# Patient Record
Sex: FEMALE | Race: WHITE | Employment: STUDENT | URBAN - METROPOLITAN AREA
[De-identification: names, ages, dates, MRNs, and addresses within clinical notes are randomized per-mention and may not be internally consistent; named-entity substitution may affect disease eponyms.]

---
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
## 2019-08-13 ENCOUNTER — ED HISTORICAL/CONVERTED ENCOUNTER (OUTPATIENT)
Dept: OTHER | Age: 19
End: 2019-08-13

## 2019-11-21 ENCOUNTER — OFFICE VISIT (OUTPATIENT)
Dept: FAMILY MEDICINE CLINIC | Age: 19
End: 2019-11-21

## 2019-11-21 VITALS
HEART RATE: 68 BPM | RESPIRATION RATE: 16 BRPM | TEMPERATURE: 98 F | DIASTOLIC BLOOD PRESSURE: 69 MMHG | WEIGHT: 107 LBS | BODY MASS INDEX: 19.69 KG/M2 | OXYGEN SATURATION: 99 % | HEIGHT: 62 IN | SYSTOLIC BLOOD PRESSURE: 103 MMHG

## 2019-11-21 DIAGNOSIS — R41.840 INATTENTION: Primary | ICD-10-CM

## 2019-11-21 NOTE — PROGRESS NOTES
Chief Complaint   Patient presents with    Medication Evaluation     Patient presents in office today to discuss going back on Adderall. She states that she was on it as a child. She has to be on the brand name. Generic caused palpitations. No concerns. 1. Have you been to the ER, urgent care clinic since your last visit? Hospitalized since your last visit? No    2. Have you seen or consulted any other health care providers outside of the [address] [place] [address] since your last visit? Include any pap smears or colon screening.  No    Learning Assessment 3/8/2016   PRIMARY LEARNER Patient   HIGHEST LEVEL OF EDUCATION - PRIMARY LEARNER  DID NOT GRADUATE HIGH SCHOOL   BARRIERS PRIMARY LEARNER NONE   CO-LEARNER CAREGIVER No   PRIMARY LANGUAGE ENGLISH   LEARNER PREFERENCE PRIMARY DEMONSTRATION   ANSWERED BY pt   RELATIONSHIP SELF

## 2019-11-21 NOTE — PROGRESS NOTES
Chief Complaint   Patient presents with    Medication Evaluation     Mickey Armando is a 20 y.o. female who presents for evaluation. Was diagnosed with ADHD as a child and would like to get back on medication. States she has not been on medication for years because her father got custody of her and did not agree with medication. Prior to living with her father she was started on Adderall by pediatrician. States that she had palpations with generic so she can only be on brand name. Does not have proof of neuro psych testing today. No discussion of ADHD in previous notes from this office. Focus and sitting still is most problematic. Now in college and having trouble with completing assignments. Reviewed PmHx, RxHx, FmHx, SocHx, AllgHx and updated and dated in the chart. Patient Active Problem List    Diagnosis    Acute stress disorder    Agoraphobia with panic disorder       Review of Systems - negative except as listed above in the HPI    Objective:     Vitals:    11/21/19 0928   BP: 103/69   Pulse: 68   Resp: 16   Temp: 98 °F (36.7 °C)   TempSrc: Oral   SpO2: 99%   Weight: 107 lb (48.5 kg)   Height: 5' 2\" (1.575 m)     Physical Examination: General appearance - alert, well appearing, and in no distress  Mental status - alert, oriented to person, place, and time  Chest - clear to auscultation, no wheezes, rales or rhonchi, symmetric air entry  Heart - normal rate, regular rhythm, normal S1, S2, no murmurs, rubs, clicks or gallops    Assessment/ Plan:   Diagnoses and all orders for this visit:    1. Inattention  -     REFERRAL TO NEUROPSYCHOLOGY  - Advised that in order to get medication covered she needs proof of neuropsych testing, can either try and get documentation from mother or make appt with [facility] for testing.  - Pt to return to office once has neuropsych testing to confirm diagosis. Advised will need UDS on next visit in order to start medication.  Pt verbalizes understanding of plan. Follow-up and Dispositions    · Return if symptoms worsen or fail to improve. I have discussed the diagnosis with the patient and the intended plan as seen in the above orders. The patient understands and agrees with the plan. The patient has received an after-visit summary and questions were answered concerning future plans. Medication Side Effects and Warnings were discussed with patient  Patient Labs were reviewed and or requested:  Patient Past Records were reviewed and or requested    Zaid Erickson, LESLEY  [address] [facility]    There are no Patient Instructions on file for this visit.

## 2020-01-30 ENCOUNTER — DOCUMENTATION ONLY (OUTPATIENT)
Dept: FAMILY MEDICINE CLINIC | Age: 20
End: 2020-01-30

## 2020-02-06 ENCOUNTER — DOCUMENTATION ONLY (OUTPATIENT)
Dept: FAMILY MEDICINE CLINIC | Age: 20
End: 2020-02-06

## 2020-02-06 ENCOUNTER — OFFICE VISIT (OUTPATIENT)
Dept: FAMILY MEDICINE CLINIC | Age: 20
End: 2020-02-06

## 2020-02-06 VITALS
HEIGHT: 62 IN | BODY MASS INDEX: 20.06 KG/M2 | OXYGEN SATURATION: 99 % | TEMPERATURE: 98.1 F | SYSTOLIC BLOOD PRESSURE: 103 MMHG | RESPIRATION RATE: 16 BRPM | DIASTOLIC BLOOD PRESSURE: 67 MMHG | WEIGHT: 109 LBS | HEART RATE: 80 BPM

## 2020-02-06 DIAGNOSIS — F98.8 ATTENTION DEFICIT DISORDER (ADD) IN ADULT: Primary | ICD-10-CM

## 2020-02-06 DIAGNOSIS — Z51.81 MEDICATION MONITORING ENCOUNTER: ICD-10-CM

## 2020-02-06 LAB
BARBITURATES UR POC: NEGATIVE
BENZODIAZEPINES UR POC: NEGATIVE
COCAINE QL URINE POC: NEGATIVE
LOT EXP DATE POC: NORMAL
LOT NUMBER POC: NORMAL
MARIJUANA (THC) QL URINE POC: NEGATIVE
MDMA/ECSTASY UR POC: NEGATIVE
METHADONE QL URINE POC: NEGATIVE
METHAMPHETAMINE QL URINE POC: NEGATIVE
NTI OTHER MICRO TRANSPORT 977598: NEGATIVE
OPIATES QL URINE POC: NEGATIVE
OXYCODONE UR POC: NEGATIVE
VALID INTERNAL CONTROL?: YES

## 2020-02-06 RX ORDER — CETIRIZINE HCL 10 MG
TABLET ORAL
COMMUNITY

## 2020-02-06 RX ORDER — BISMUTH SUBSALICYLATE 262 MG
1 TABLET,CHEWABLE ORAL DAILY
COMMUNITY

## 2020-02-06 NOTE — PATIENT INSTRUCTIONS
A Healthy Lifestyle: Care Instructions  Your Care Instructions    A healthy lifestyle can help you feel good, stay at a healthy weight, and have plenty of energy for both work and play. A healthy lifestyle is something you can share with your whole family. A healthy lifestyle also can lower your risk for serious health problems, such as high blood pressure, heart disease, and diabetes. You can follow a few steps listed below to improve your health and the health of your family. Follow-up care is a key part of your treatment and safety. Be sure to make and go to all appointments, and call your doctor if you are having problems. It's also a good idea to know your test results and keep a list of the medicines you take. How can you care for yourself at home? · Do not eat too much sugar, fat, or fast foods. You can still have dessert and treats now and then. The goal is moderation. · Start small to improve your eating habits. Pay attention to portion sizes, drink less juice and soda pop, and eat more fruits and vegetables. ? Eat a healthy amount of food. A 3-ounce serving of meat, for example, is about the size of a deck of cards. Fill the rest of your plate with vegetables and whole grains. ? Limit the amount of soda and sports drinks you have every day. Drink more water when you are thirsty. ? Eat at least 5 servings of fruits and vegetables every day. It may seem like a lot, but it is not hard to reach this goal. A serving or helping is 1 piece of fruit, 1 cup of vegetables, or 2 cups of leafy, raw vegetables. Have an apple or some carrot sticks as an afternoon snack instead of a candy bar. Try to have fruits and/or vegetables at every meal.  · Make exercise part of your daily routine. You may want to start with simple activities, such as walking, bicycling, or slow swimming. Try to be active 30 to 60 minutes every day. You do not need to do all 30 to 60 minutes all at once.  For example, you can exercise 3 times a day for 10 or 20 minutes. Moderate exercise is safe for most people, but it is always a good idea to talk to your doctor before starting an exercise program.  · Keep moving. [place] the lawn, work in the garden, or tuta.co. Take the stairs instead of the elevator at work. · If you smoke, quit. People who smoke have an increased risk for heart attack, stroke, cancer, and other lung illnesses. Quitting is hard, but there are ways to boost your chance of quitting tobacco for good. ? Use nicotine gum, patches, or lozenges. ? Ask your doctor about stop-smoking programs and medicines. ? Keep trying. In addition to reducing your risk of diseases in the future, you will notice some benefits soon after you stop using tobacco. If you have shortness of breath or asthma symptoms, they will likely get better within a few weeks after you quit. · Limit how much alcohol you drink. Moderate amounts of alcohol (up to 2 drinks a day for men, 1 drink a day for women) are okay. But drinking too much can lead to liver problems, high blood pressure, and other health problems. Family health  If you have a family, there are many things you can do together to improve your health. · Eat meals together as a family as often as possible. · Eat healthy foods. This includes fruits, vegetables, lean meats and dairy, and whole grains. · Include your family in your fitness plan. Most people think of activities such as jogging or tennis as the way to fitness, but there are many ways you and your family can be more active. Anything that makes you breathe hard and gets your heart pumping is exercise. Here are some tips:  ? Walk to do errands or to take your child to school or the bus.  ? Go for a family bike ride after dinner instead of watching TV. Where can you learn more? Go to http://lucila-petar.info/. Enter C888 in the search box to learn more about \"A Healthy Lifestyle: Care Instructions. \"  Current as of: May 28, 2019  Content Version: 12.2  © 5576-0884 [employer]. Care instructions adapted under license by [employer] (which disclaims liability or warranty for this information). If you have questions about a medical condition or this instruction, always ask your healthcare professional. [address] any warranty or liability for your use of this information.

## 2020-02-06 NOTE — PROGRESS NOTES
Chief Complaint   Patient presents with    Medication Evaluation     Patient presents in office today to discuss going on ADHD meds. Was [facility] and had a neuropsych eval done. No concerns. 1. Have you been to the ER, urgent care clinic since your last visit? Hospitalized since your last visit? No    2. Have you seen or consulted any other health care providers outside of the [address] [place] [address] since your last visit? Include any pap smears or colon screening.  Yes When: 1/30/20 Where: [facility] Reason for visit: neuropsych eval    Learning Assessment 3/8/2016   PRIMARY LEARNER Patient   HIGHEST LEVEL OF EDUCATION - PRIMARY LEARNER  DID NOT GRADUATE HIGH SCHOOL   BARRIERS PRIMARY LEARNER NONE   CO-LEARNER CAREGIVER No   PRIMARY LANGUAGE ENGLISH   LEARNER PREFERENCE PRIMARY DEMONSTRATION   ANSWERED BY pt   RELATIONSHIP SELF

## 2020-02-06 NOTE — PROGRESS NOTES
Jolene Brown is a 20 y.o. female   Chief Complaint   Patient presents with    Medication Evaluation    pt here to discuss her neuropsych eval and was seen at [facility] and Dx with ADD. Pt was also dx as a child and was on medication which had worked well for her. No hx of drug abuse or misuse. she is a 20y.o. year old female who presents for evalution. Reviewed PmHx, RxHx, FmHx, SocHx, AllgHx and updated and dated in the chart. Review of Systems - negative except as listed above in the HPI    Objective:     Vitals:    02/06/20 0908   BP: 103/67   Pulse: 80   Resp: 16   Temp: 98.1 °F (36.7 °C)   TempSrc: Oral   SpO2: 99%   Weight: 109 lb (49.4 kg)   Height: 5' 2\" (1.575 m)       Current Outpatient Medications   Medication Sig    cetirizine (ZYRTEC) 10 mg tablet Take  by mouth.  multivitamin (ONE A DAY) tablet Take 1 Tab by mouth daily.  lisdexamfetamine (VYVANSE) 30 mg capsule Take 1 Cap by mouth every morning. Max Daily Amount: 30 mg.    ondansetron (ZOFRAN ODT) 4 mg disintegrating tablet Take 1 Tab by mouth every eight (8) hours as needed for Nausea.  guaiFENesin-codeine (CHERATUSSIN AC) 100-10 mg/5 mL solution Take 5 mL by mouth three (3) times daily as needed for Cough. Max Daily Amount: 15 mL.  hydrOXYzine (VISTARIL) 50 mg capsule Take 1 Cap by mouth three (3) times daily as needed for Anxiety.  citalopram (CELEXA) 20 mg tablet Take 1 Tab by mouth daily.  sertraline (ZOLOFT) 50 mg tablet Take 1 Tab by mouth daily. No current facility-administered medications for this visit. Physical Examination: General appearance - alert, well appearing, and in no distress  Mental status - alert, oriented to person, place, and time  Chest - clear to auscultation, no wheezes, rales or rhonchi, symmetric air entry  Heart - normal rate, regular rhythm, normal S1, S2, no murmurs, rubs, clicks or gallops      Assessment/ Plan:   Diagnoses and all orders for this visit:    1. Attention deficit disorder (ADD) in adult  -     lisdexamfetamine (VYVANSE) 30 mg capsule; Take 1 Cap by mouth every morning. Max Daily Amount: 30 mg.    2. Medication monitoring encounter  -     AMB POC ALERE ICUP DX DRUG SCREEN 10     neuropsych eval sent for scanning. Utox and  both appropriate  Follow-up and Dispositions    · Return in about 4 weeks (around 3/5/2020), or if symptoms worsen or fail to improve. I have discussed the diagnosis with the patient and the intended plan as seen in the above orders. The patient has received an after-visit summary and questions were answered concerning future plans. Pt conveyed understanding of plan.     Medication Side Effects and Warnings were discussed with patient      Se Serna, DO

## 2020-02-21 NOTE — PROGRESS NOTES
Per [employer] the PA for Vyvanse has been approved until 2/5/2021. Ref#: [id number]    Pharm notified via fax.

## 2020-02-25 ENCOUNTER — TELEPHONE (OUTPATIENT)
Dept: FAMILY MEDICINE CLINIC | Age: 20
End: 2020-02-25

## 2020-02-25 NOTE — TELEPHONE ENCOUNTER
Pt would like a call back regarding her Vyvanse prescription. Pt stated that she has a few questions.   Phone [phone]

## 2020-02-25 NOTE — TELEPHONE ENCOUNTER
Called and spoke with patient. She wanted to know when she was to f/u with Dr. Stevan Castellon. I advised that after her last visit he wanted her to f/u in 4 weeks.  F/u apt scheduled for 3/4/20

## 2020-03-04 ENCOUNTER — OFFICE VISIT (OUTPATIENT)
Dept: FAMILY MEDICINE CLINIC | Age: 20
End: 2020-03-04

## 2020-03-04 VITALS
RESPIRATION RATE: 16 BRPM | HEART RATE: 82 BPM | BODY MASS INDEX: 19.51 KG/M2 | HEIGHT: 62 IN | OXYGEN SATURATION: 100 % | TEMPERATURE: 97.6 F | SYSTOLIC BLOOD PRESSURE: 98 MMHG | DIASTOLIC BLOOD PRESSURE: 65 MMHG | WEIGHT: 106 LBS

## 2020-03-04 DIAGNOSIS — F98.8 ATTENTION DEFICIT DISORDER (ADD) IN ADULT: ICD-10-CM

## 2020-03-04 NOTE — PROGRESS NOTES
Rama Phoenix is a 20 y.o. female   Chief Complaint   Patient presents with    Medication Refill    pt states the vyvanse is helping with her focus and is less forgetful. She is tolerating. Was a little jittery when she first started but this has resolved. No issues with insomnia. Appetite is fine. Pt works swing shift so takes around 1030 to 11 am has not taken today. Gets off of work weekdays 1am weekends 2am.      she is a 20y.o. year old female who presents for evalution. Reviewed PmHx, RxHx, FmHx, SocHx, AllgHx and updated and dated in the chart. Review of Systems - negative except as listed above in the HPI    Objective:     Vitals:    03/04/20 0953 03/04/20 1017   BP: 98/65    Pulse: (!) 107 82   Resp: 16    Temp: 97.6 °F (36.4 °C)    TempSrc: Oral    SpO2: 100%    Weight: 106 lb (48.1 kg)    Height: 5' 2\" (1.575 m)        Current Outpatient Medications   Medication Sig    Lisdexamfetamine (VYVANSE) 40 mg capsule Take 1 Cap by mouth every morning. Max Daily Amount: 40 mg.    cetirizine (ZYRTEC) 10 mg tablet Take  by mouth.  multivitamin (ONE A DAY) tablet Take 1 Tab by mouth daily.  ondansetron (ZOFRAN ODT) 4 mg disintegrating tablet Take 1 Tab by mouth every eight (8) hours as needed for Nausea.  guaiFENesin-codeine (CHERATUSSIN AC) 100-10 mg/5 mL solution Take 5 mL by mouth three (3) times daily as needed for Cough. Max Daily Amount: 15 mL.  hydrOXYzine (VISTARIL) 50 mg capsule Take 1 Cap by mouth three (3) times daily as needed for Anxiety.  citalopram (CELEXA) 20 mg tablet Take 1 Tab by mouth daily.  sertraline (ZOLOFT) 50 mg tablet Take 1 Tab by mouth daily. No current facility-administered medications for this visit.         Physical Examination: General appearance - alert, well appearing, and in no distress  Chest - clear to auscultation, no wheezes, rales or rhonchi, symmetric air entry  Heart - normal rate, regular rhythm, normal S1, S2, no murmurs, rubs, clicks or gallops      Assessment/ Plan:   Diagnoses and all orders for this visit:    1. Attention deficit disorder (ADD) in adult  -     Lisdexamfetamine (VYVANSE) 40 mg capsule; Take 1 Cap by mouth every morning. Max Daily Amount: 40 mg. Follow-up and Dispositions    · Return in about 4 weeks (around 4/1/2020), or if symptoms worsen or fail to improve. I have discussed the diagnosis with the patient and the intended plan as seen in the above orders. The patient has received an after-visit summary and questions were answered concerning future plans. Pt conveyed understanding of plan.     Medication Side Effects and Warnings were discussed with patient      Malathi Yañez, DO

## 2020-03-04 NOTE — PATIENT INSTRUCTIONS
A Healthy Lifestyle: Care Instructions  Your Care Instructions    A healthy lifestyle can help you feel good, stay at a healthy weight, and have plenty of energy for both work and play. A healthy lifestyle is something you can share with your whole family. A healthy lifestyle also can lower your risk for serious health problems, such as high blood pressure, heart disease, and diabetes. You can follow a few steps listed below to improve your health and the health of your family. Follow-up care is a key part of your treatment and safety. Be sure to make and go to all appointments, and call your doctor if you are having problems. It's also a good idea to know your test results and keep a list of the medicines you take. How can you care for yourself at home? · Do not eat too much sugar, fat, or fast foods. You can still have dessert and treats now and then. The goal is moderation. · Start small to improve your eating habits. Pay attention to portion sizes, drink less juice and soda pop, and eat more fruits and vegetables. ? Eat a healthy amount of food. A 3-ounce serving of meat, for example, is about the size of a deck of cards. Fill the rest of your plate with vegetables and whole grains. ? Limit the amount of soda and sports drinks you have every day. Drink more water when you are thirsty. ? Eat at least 5 servings of fruits and vegetables every day. It may seem like a lot, but it is not hard to reach this goal. A serving or helping is 1 piece of fruit, 1 cup of vegetables, or 2 cups of leafy, raw vegetables. Have an apple or some carrot sticks as an afternoon snack instead of a candy bar. Try to have fruits and/or vegetables at every meal.  · Make exercise part of your daily routine. You may want to start with simple activities, such as walking, bicycling, or slow swimming. Try to be active 30 to 60 minutes every day. You do not need to do all 30 to 60 minutes all at once.  For example, you can exercise 3 times a day for 10 or 20 minutes. Moderate exercise is safe for most people, but it is always a good idea to talk to your doctor before starting an exercise program.  · Keep moving. Anamika Knuckles the lawn, work in the garden, or [employer]. Take the stairs instead of the elevator at work. · If you smoke, quit. People who smoke have an increased risk for heart attack, stroke, cancer, and other lung illnesses. Quitting is hard, but there are ways to boost your chance of quitting tobacco for good. ? Use nicotine gum, patches, or lozenges. ? Ask your doctor about stop-smoking programs and medicines. ? Keep trying. In addition to reducing your risk of diseases in the future, you will notice some benefits soon after you stop using tobacco. If you have shortness of breath or asthma symptoms, they will likely get better within a few weeks after you quit. · Limit how much alcohol you drink. Moderate amounts of alcohol (up to 2 drinks a day for men, 1 drink a day for women) are okay. But drinking too much can lead to liver problems, high blood pressure, and other health problems. Family health  If you have a family, there are many things you can do together to improve your health. · Eat meals together as a family as often as possible. · Eat healthy foods. This includes fruits, vegetables, lean meats and dairy, and whole grains. · Include your family in your fitness plan. Most people think of activities such as jogging or tennis as the way to fitness, but there are many ways you and your family can be more active. Anything that makes you breathe hard and gets your heart pumping is exercise. Here are some tips:  ? Walk to do errands or to take your child to school or the bus.  ? Go for a family bike ride after dinner instead of watching TV. Where can you learn more? Go to http://lucila-petar.info/. Enter G104 in the search box to learn more about \"A Healthy Lifestyle: Care Instructions. \"  Current as of: May 28, 2019  Content Version: 12.2  © 5557-4618 [employer]. Care instructions adapted under license by [employer] (which disclaims liability or warranty for this information). If you have questions about a medical condition or this instruction, always ask your healthcare professional. [address] any warranty or liability for your use of this information.

## 2020-03-04 NOTE — PROGRESS NOTES
Chief Complaint   Patient presents with    Medication Refill     Patient presents in office today for med refill of Vyvanse. Would like top discuss getting the dosage increased. No other concerns. 1. Have you been to the ER, urgent care clinic since your last visit? Hospitalized since your last visit? No    2. Have you seen or consulted any other health care providers outside of the [address] [place] [address] since your last visit? Include any pap smears or colon screening.  No    Learning Assessment 3/8/2016   PRIMARY LEARNER Patient   HIGHEST LEVEL OF EDUCATION - PRIMARY LEARNER  DID NOT GRADUATE HIGH SCHOOL   BARRIERS PRIMARY LEARNER NONE   CO-LEARNER CAREGIVER No   PRIMARY LANGUAGE ENGLISH   LEARNER PREFERENCE PRIMARY DEMONSTRATION   ANSWERED BY pt   RELATIONSHIP SELF

## 2020-03-25 ENCOUNTER — TELEPHONE (OUTPATIENT)
Dept: FAMILY MEDICINE CLINIC | Age: 20
End: 2020-03-25

## 2020-03-25 DIAGNOSIS — F98.8 ATTENTION DEFICIT DISORDER (ADD) IN ADULT: ICD-10-CM

## 2020-03-25 NOTE — TELEPHONE ENCOUNTER
Called and informed patient that she has an RX available to April and May at pharmacy and is due to f/u in May. Patient verbalized understanding.

## 2020-03-25 NOTE — TELEPHONE ENCOUNTER
Can't fill until 4/3 but I did send in that Rx and the following months and will need a visit after these 2 Rx run out

## 2020-05-04 DIAGNOSIS — F98.8 ATTENTION DEFICIT DISORDER (ADD) IN ADULT: ICD-10-CM

## 2020-05-06 NOTE — TELEPHONE ENCOUNTER
Called and LVM for patient to call the office back. Dr. Priya Baum wrote 3 scripts when she was seen in February. Last script was able to be filled on 5/3/20.

## 2020-05-06 NOTE — TELEPHONE ENCOUNTER
Patient called back. She states that the pharmacy told her they don't have anymore prescriptions for her to . Called pharmacy to confirm. They state that they have the RX to fill on 5/3/20 but there was a hold. They have removed the hold and are filling for patient. I called patient back and notified her. Patient verbalized understanding.

## 2020-06-16 DIAGNOSIS — F98.8 ATTENTION DEFICIT DISORDER (ADD) IN ADULT: ICD-10-CM

## 2020-06-22 ENCOUNTER — VIRTUAL VISIT (OUTPATIENT)
Dept: FAMILY MEDICINE CLINIC | Age: 20
End: 2020-06-22

## 2020-06-22 DIAGNOSIS — F98.8 ATTENTION DEFICIT DISORDER (ADD) IN ADULT: ICD-10-CM

## 2020-06-22 NOTE — PROGRESS NOTES
Here for VV    Consent: Dora Jimenez, who was seen by synchronous (real-time) audio-video technology, and/or her healthcare decision maker, is aware that this patient-initiated, Telehealth encounter on 6/22/2020 is a billable service, with coverage as determined by her insurance carrier. She is aware that she may receive a bill and has provided verbal consent to proceed: Yes. 712  Subjective:   Dora Jimenez is a 23 y.o. female who was seen for No chief complaint on file. Prior to Admission medications    Medication Sig Start Date End Date Taking? Authorizing Provider   Lisdexamfetamine (VYVANSE) 40 mg capsule Take 1 Cap by mouth every morning for 30 days. Max Daily Amount: 40 mg. 7/22/20 8/21/20 Yes Richardine Primrose, MD   Lisdexamfetamine (Vyvanse) 40 mg capsule Take 1 Cap by mouth every morning for 30 days. Max Daily Amount: 40 mg. 8/22/20 9/21/20 Yes Richardine Primrose, MD   Lisdexamfetamine (VYVANSE) 40 mg capsule Take 1 Cap by mouth every morning. Max Daily Amount: 40 mg. 6/22/20  Yes Richardine Primrose, MD   Lisdexamfetamine (VYVANSE) 40 mg capsule Take 1 Cap by mouth every morning for 30 days. Max Daily Amount: 40 mg. 4/3/20 6/22/20  Jefferson Show, DO   Lisdexamfetamine (Vyvanse) 40 mg capsule Take 1 Cap by mouth every morning for 30 days. Max Daily Amount: 40 mg. 5/3/20 6/22/20  Bria West H, DO   cetirizine (ZYRTEC) 10 mg tablet Take  by mouth. Provider, Historical   multivitamin (ONE A DAY) tablet Take 1 Tab by mouth daily. Provider, Historical   ondansetron (ZOFRAN ODT) 4 mg disintegrating tablet Take 1 Tab by mouth every eight (8) hours as needed for Nausea. 8/16/17   Richardine Primrose, MD   guaiFENesin-codeine (CHERATUSSIN AC) 100-10 mg/5 mL solution Take 5 mL by mouth three (3) times daily as needed for Cough. Max Daily Amount: 15 mL. 3/20/17 6/22/20  Flory Dorsey DO   hydrOXYzine (VISTARIL) 50 mg capsule Take 1 Cap by mouth three (3) times daily as needed for Anxiety.  4/5/16 Flory Dorsey, DO   citalopram (CELEXA) 20 mg tablet Take 1 Tab by mouth daily. 4/5/16   Flory Dorsey, DO   sertraline (ZOLOFT) 50 mg tablet Take 1 Tab by mouth daily. 4/5/16   Davian David, DO     Allergies   Allergen Reactions    Adderall [Dextroamphetamine-Amphetamine] Palpitations     Patient Active Problem List    Diagnosis    Acute stress disorder    Agoraphobia with panic disorder     Patient Active Problem List   Diagnosis Code    Acute stress disorder F43.0    Agoraphobia with panic disorder F40.01       ROS    Objective:   Vital Signs: (As obtained by patient/caregiver at home)  There were no vitals taken for this visit.      [INSTRUCTIONS:  \"[x]\" Indicates a positive item  \"[]\" Indicates a negative item  -- DELETE ALL ITEMS NOT EXAMINED]    Constitutional: [x] Appears well-developed and well-nourished [x] No apparent distress      [] Abnormal -     Mental status: [x] Alert and awake  [x] Oriented to person/place/time [x] Able to follow commands    [] Abnormal -     Eyes:   EOM    [x]  Normal    [] Abnormal -   Sclera  [x]  Normal    [] Abnormal -          Discharge [x]  None visible   [] Abnormal -     HENT: [x] Normocephalic, atraumatic  [] Abnormal -   [x] Mouth/Throat: Mucous membranes are moist    External Ears [x] Normal  [] Abnormal -    Neck: [x] No visualized mass [] Abnormal -     Pulmonary/Chest: [x] Respiratory effort normal   [x] No visualized signs of difficulty breathing or respiratory distress        [] Abnormal -        Neurological:        [x] No Facial Asymmetry (Cranial nerve 7 motor function) (limited exam due to video visit)          [x] No gaze palsy        [] Abnormal -          Skin:        [x] No significant exanthematous lesions or discoloration noted on facial skin         [] Abnormal -            Psychiatric:       [x] Normal Affect [] Abnormal -        [x] No Hallucinations    Other pertinent observable physical exam findings:-              Assessment & Plan:   Diagnoses and all orders for this visit:    1. Attention deficit disorder (ADD) in adult  -     Lisdexamfetamine (VYVANSE) 40 mg capsule; Take 1 Cap by mouth every morning for 30 days. Max Daily Amount: 40 mg.  -     Lisdexamfetamine (Vyvanse) 40 mg capsule; Take 1 Cap by mouth every morning for 30 days. Max Daily Amount: 40 mg.  -     Lisdexamfetamine (VYVANSE) 40 mg capsule; Take 1 Cap by mouth every morning. Max Daily Amount: 40 mg.  -stable on rx          Follow-up and Dispositions    · Return in about 3 months (around 9/22/2020) for add. We discussed the expected course, resolution and complications of the diagnosis(es) in detail. Medication risks, benefits, costs, interactions, and alternatives were discussed as indicated. I advised her to contact the office if her condition worsens, changes or fails to improve as anticipated. She expressed understanding with the diagnosis(es) and plan. Elayne Coleman is a 23 y.o. female being evaluated by a video visit encounter for concerns as above. A caregiver was present when appropriate. Due to this being a TeleHealth encounter (During [facility]- public health emergency), evaluation of the following organ systems was limited: Vitals/Constitutional/EENT/Resp/CV/GI//MS/Neuro/Skin/Heme-Lymph-Imm. Pursuant to the emergency declaration under the [facility], [facility] waiver authority and the [employer] and [employer] General Act, this Virtual  Visit was conducted, with patient's (and/or legal guardian's) consent, to reduce the patient's risk of exposure to COVID-19 and provide necessary medical care. Services were provided through a video synchronous discussion virtually to substitute for in-person clinic visit. Patient and provider were located at their individual homes.         Sandra Lan MD

## 2020-10-02 ENCOUNTER — VIRTUAL VISIT (OUTPATIENT)
Dept: FAMILY MEDICINE CLINIC | Age: 20
End: 2020-10-02
Payer: MEDICAID

## 2020-10-02 DIAGNOSIS — F98.8 ATTENTION DEFICIT DISORDER (ADD) IN ADULT: ICD-10-CM

## 2020-10-02 PROCEDURE — 99213 OFFICE O/P EST LOW 20 MIN: CPT | Performed by: FAMILY MEDICINE

## 2020-10-02 NOTE — PROGRESS NOTES
Rishabh Lomas is a 23 y.o. female who was seen by synchronous (real-time) audio-video technology on 10/2/2020 for No chief complaint on file. Assessment & Plan:   Diagnoses and all orders for this visit:    1. Attention deficit disorder (ADD) in adult  -     Lisdexamfetamine (VYVANSE) 50 mg cap; Take 1 Cap by mouth every morning. Max Daily Amount: 50 mg.        F/u 1 month    Subjective:   Pt on vyvanse and states the 40mg is not working very well for her. Pt is not having any side effects, no palps or insomnia or lsos of appetite. States still has a hard time focusing and staying on task    Prior to Admission medications    Medication Sig Start Date End Date Taking? Authorizing Provider   Lisdexamfetamine (VYVANSE) 50 mg cap Take 1 Cap by mouth every morning. Max Daily Amount: 50 mg. 10/2/20  Yes Flory Dorsey DO   Lisdexamfetamine (VYVANSE) 40 mg capsule Take 1 Cap by mouth every morning. Max Daily Amount: 40 mg. 6/22/20 10/2/20  Keaton Coleman MD   cetirizine (ZYRTEC) 10 mg tablet Take  by mouth. Provider, Historical   multivitamin (ONE A DAY) tablet Take 1 Tab by mouth daily. Provider, Historical   ondansetron (ZOFRAN ODT) 4 mg disintegrating tablet Take 1 Tab by mouth every eight (8) hours as needed for Nausea. 8/16/17   Keaton Coleman MD   hydrOXYzine (VISTARIL) 50 mg capsule Take 1 Cap by mouth three (3) times daily as needed for Anxiety. 4/5/16   Flory Dorsey DO   citalopram (CELEXA) 20 mg tablet Take 1 Tab by mouth daily. 4/5/16   Flory Dorsey DO   sertraline (ZOLOFT) 50 mg tablet Take 1 Tab by mouth daily. 4/5/16   Kerry Jaime DO     Patient Active Problem List   Diagnosis Code    Acute stress disorder F43.0    Agoraphobia with panic disorder F40.01       ROS    Objective:   No flowsheet data found.      [INSTRUCTIONS:  \"[x]\" Indicates a positive item  \"[]\" Indicates a negative item  -- DELETE ALL ITEMS NOT EXAMINED]    Constitutional: [x] Appears well-developed and well-nourished [x] No apparent distress      [] Abnormal -     Mental status: [x] Alert and awake  [x] Oriented to person/place/time [x] Able to follow commands    [] Abnormal -     Eyes:   EOM    [x]  Normal    [] Abnormal -   Sclera  [x]  Normal    [] Abnormal -          Discharge [x]  None visible   [] Abnormal -     HENT: [x] Normocephalic, atraumatic  [] Abnormal -   [x] Mouth/Throat: Mucous membranes are moist    External Ears [x] Normal  [] Abnormal -    Neck: [x] No visualized mass [] Abnormal -     Pulmonary/Chest: [x] Respiratory effort normal   [x] No visualized signs of difficulty breathing or respiratory distress        [] Abnormal -      Musculoskeletal:   [x] Normal gait with no signs of ataxia         [x] Normal range of motion of neck        [] Abnormal -     Neurological:        [x] No Facial Asymmetry (Cranial nerve 7 motor function) (limited exam due to video visit)          [x] No gaze palsy        [] Abnormal -          Skin:        [x] No significant exanthematous lesions or discoloration noted on facial skin         [] Abnormal -            Psychiatric:       [x] Normal Affect [] Abnormal -        [x] No Hallucinations    Other pertinent observable physical exam findings:-        We discussed the expected course, resolution and complications of the diagnosis(es) in detail. Medication risks, benefits, costs, interactions, and alternatives were discussed as indicated. I advised her to contact the office if her condition worsens, changes or fails to improve as anticipated. She expressed understanding with the diagnosis(es) and plan. Helele Cope, who was evaluated through a patient-initiated, synchronous (real-time) audio-video encounter, and/or her healthcare decision maker, is aware that it is a billable service, with coverage as determined by her insurance carrier. She provided verbal consent to proceed: Yes, and patient identification was verified.  It was conducted pursuant to the emergency declaration under the [facility], [address] [place] [address] authority and the [employer] and [employer] General Act. A caregiver was present when appropriate. Ability to conduct physical exam was limited. I was at home. The patient was at home.       [address] [facility] [address], DO

## 2020-10-02 NOTE — PATIENT INSTRUCTIONS
A Healthy Lifestyle: Care Instructions Your Care Instructions A healthy lifestyle can help you feel good, stay at a healthy weight, and have plenty of energy for both work and play. A healthy lifestyle is something you can share with your whole family. A healthy lifestyle also can lower your risk for serious health problems, such as high blood pressure, heart disease, and diabetes. You can follow a few steps listed below to improve your health and the health of your family. Follow-up care is a key part of your treatment and safety. Be sure to make and go to all appointments, and call your doctor if you are having problems. It's also a good idea to know your test results and keep a list of the medicines you take. How can you care for yourself at home? · Do not eat too much sugar, fat, or fast foods. You can still have dessert and treats now and then. The goal is moderation. · Start small to improve your eating habits. Pay attention to portion sizes, drink less juice and soda pop, and eat more fruits and vegetables. ? Eat a healthy amount of food. A 3-ounce serving of meat, for example, is about the size of a deck of cards. Fill the rest of your plate with vegetables and whole grains. ? Limit the amount of soda and sports drinks you have every day. Drink more water when you are thirsty. ? Eat at least 5 servings of fruits and vegetables every day. It may seem like a lot, but it is not hard to reach this goal. A serving or helping is 1 piece of fruit, 1 cup of vegetables, or 2 cups of leafy, raw vegetables. Have an apple or some carrot sticks as an afternoon snack instead of a candy bar. Try to have fruits and/or vegetables at every meal. 
· Make exercise part of your daily routine. You may want to start with simple activities, such as walking, bicycling, or slow swimming. Try to be active 30 to 60 minutes every day.  You do not need to do all 30 to 60 minutes all at once. For example, you can exercise 3 times a day for 10 or 20 minutes. Moderate exercise is safe for most people, but it is always a good idea to talk to your doctor before starting an exercise program. 
· Keep moving. Karen Memos the lawn, work in the garden, or [employer]. Take the stairs instead of the elevator at work. · If you smoke, quit. People who smoke have an increased risk for heart attack, stroke, cancer, and other lung illnesses. Quitting is hard, but there are ways to boost your chance of quitting tobacco for good. ? Use nicotine gum, patches, or lozenges. ? Ask your doctor about stop-smoking programs and medicines. ? Keep trying. In addition to reducing your risk of diseases in the future, you will notice some benefits soon after you stop using tobacco. If you have shortness of breath or asthma symptoms, they will likely get better within a few weeks after you quit. · Limit how much alcohol you drink. Moderate amounts of alcohol (up to 2 drinks a day for men, 1 drink a day for women) are okay. But drinking too much can lead to liver problems, high blood pressure, and other health problems. Family health If you have a family, there are many things you can do together to improve your health. · Eat meals together as a family as often as possible. · Eat healthy foods. This includes fruits, vegetables, lean meats and dairy, and whole grains. · Include your family in your fitness plan. Most people think of activities such as jogging or tennis as the way to fitness, but there are many ways you and your family can be more active. Anything that makes you breathe hard and gets your heart pumping is exercise. Here are some tips: 
? Walk to do errands or to take your child to school or the bus. 
? Go for a family bike ride after dinner instead of watching TV. Where can you learn more? Go to http://www.[employer]/ Enter H334 in the search box to learn more about \"A Healthy Lifestyle: Care Instructions. \" Current as of: January 31, 2020               Content Version: 12.6 © 3927-0392 [employer]. Care instructions adapted under license by [employer] (which disclaims liability or warranty for this information). If you have questions about a medical condition or this instruction, always ask your healthcare professional. [place] [address] any warranty or liability for your use of this information.

## 2020-11-03 ENCOUNTER — VIRTUAL VISIT (OUTPATIENT)
Dept: FAMILY MEDICINE CLINIC | Age: 20
End: 2020-11-03
Payer: MEDICAID

## 2020-11-03 DIAGNOSIS — F98.8 ATTENTION DEFICIT DISORDER (ADD) IN ADULT: ICD-10-CM

## 2020-11-03 PROCEDURE — 99213 OFFICE O/P EST LOW 20 MIN: CPT | Performed by: FAMILY MEDICINE

## 2020-11-03 NOTE — PROGRESS NOTES
Progress Note    she is a 23y.o. year old female who presents for evalution. Subjective:     Pt for refill of vyvanse 50mg and is not having any issues with side effects such as insomnia, no tics no worsening anxiety no loss of appetite. Currently working and in school. Reviewed PmHx, RxHx, FmHx, SocHx, AllgHx and updated and dated in the chart. Review of Systems - negative except as listed above in the HPI    Objective: There were no vitals filed for this visit. Current Outpatient Medications   Medication Sig    [START ON 1/2/2021] lisdexamfetamine (VYVANSE) 50 mg cap Take 1 Cap by mouth every morning for 30 days. Max Daily Amount: 50 mg.    [START ON 12/3/2020] lisdexamfetamine (Vyvanse) 50 mg cap Take 1 Cap by mouth daily for 30 days. Max Daily Amount: 50 mg.    lisdexamfetamine (Vyvanse) 50 mg cap Take 1 Cap by mouth daily for 30 days. Max Daily Amount: 50 mg.    cetirizine (ZYRTEC) 10 mg tablet Take  by mouth.  multivitamin (ONE A DAY) tablet Take 1 Tab by mouth daily.  ondansetron (ZOFRAN ODT) 4 mg disintegrating tablet Take 1 Tab by mouth every eight (8) hours as needed for Nausea.  hydrOXYzine (VISTARIL) 50 mg capsule Take 1 Cap by mouth three (3) times daily as needed for Anxiety.  citalopram (CELEXA) 20 mg tablet Take 1 Tab by mouth daily.  sertraline (ZOLOFT) 50 mg tablet Take 1 Tab by mouth daily. No current facility-administered medications for this visit. Physical Examination: General appearance - alert, well appearing, and in no distress  Mental status - alert, oriented to person, place, and time      Assessment/ Plan:   Diagnoses and all orders for this visit:    1. Attention deficit disorder (ADD) in adult  -     lisdexamfetamine (VYVANSE) 50 mg cap; Take 1 Cap by mouth every morning for 30 days. Max Daily Amount: 50 mg.  -     lisdexamfetamine (Vyvanse) 50 mg cap; Take 1 Cap by mouth daily for 30 days.  Max Daily Amount: 50 mg.  - lisdexamfetamine (Vyvanse) 50 mg cap; Take 1 Cap by mouth daily for 30 days. Max Daily Amount: 50 mg. Follow-up and Dispositions    · Return in about 3 months (around 2/3/2021), or if symptoms worsen or fail to improve. I have discussed the diagnosis with the patient and the intended plan as seen in the above orders. The patient has received an after-visit summary and questions were answered concerning future plans. Pt conveyed understanding of plan. Medication Side Effects and Warnings were discussed with patient      Femi TrevinoiftDO Nirmala Ramirez is a 23 y.o. female being evaluated by a Virtual Visit (video visit) encounter to address concerns as mentioned above. A caregiver was present when appropriate. Due to this being a TeleHealth encounter (During MKRGL-04 public health emergency), evaluation of the following organ systems was limited: Vitals/Constitutional/EENT/Resp/CV/GI//MS/Neuro/Skin/Heme-Lymph-Imm. Pursuant to the emergency declaration under the [address] [place] [address], [address] [place] [address] authority and the [employer] and [employer] Act, this Virtual Visit was conducted with patient's (and/or legal guardian's) consent, to reduce the risk of exposure to COVID-19 and provide necessary medical care. Services were provided through a video synchronous discussion virtually to substitute for in-person encounter. --Femi Alcazar DO on 11/3/2020 at 2:19 PM    An electronic signature was used to authenticate this note.

## 2020-11-03 NOTE — PATIENT INSTRUCTIONS
A Healthy Lifestyle: Care Instructions Your Care Instructions A healthy lifestyle can help you feel good, stay at a healthy weight, and have plenty of energy for both work and play. A healthy lifestyle is something you can share with your whole family. A healthy lifestyle also can lower your risk for serious health problems, such as high blood pressure, heart disease, and diabetes. You can follow a few steps listed below to improve your health and the health of your family. Follow-up care is a key part of your treatment and safety. Be sure to make and go to all appointments, and call your doctor if you are having problems. It's also a good idea to know your test results and keep a list of the medicines you take. How can you care for yourself at home? · Do not eat too much sugar, fat, or fast foods. You can still have dessert and treats now and then. The goal is moderation. · Start small to improve your eating habits. Pay attention to portion sizes, drink less juice and soda pop, and eat more fruits and vegetables. ? Eat a healthy amount of food. A 3-ounce serving of meat, for example, is about the size of a deck of cards. Fill the rest of your plate with vegetables and whole grains. ? Limit the amount of soda and sports drinks you have every day. Drink more water when you are thirsty. ? Eat at least 5 servings of fruits and vegetables every day. It may seem like a lot, but it is not hard to reach this goal. A serving or helping is 1 piece of fruit, 1 cup of vegetables, or 2 cups of leafy, raw vegetables. Have an apple or some carrot sticks as an afternoon snack instead of a candy bar. Try to have fruits and/or vegetables at every meal. 
· Make exercise part of your daily routine. You may want to start with simple activities, such as walking, bicycling, or slow swimming. Try to be active 30 to 60 minutes every day.  You do not need to do all 30 to 60 minutes all at once. For example, you can exercise 3 times a day for 10 or 20 minutes. Moderate exercise is safe for most people, but it is always a good idea to talk to your doctor before starting an exercise program. 
· Keep moving. [employer] the lawn, work in the garden, or [employer]. Take the stairs instead of the elevator at work. · If you smoke, quit. People who smoke have an increased risk for heart attack, stroke, cancer, and other lung illnesses. Quitting is hard, but there are ways to boost your chance of quitting tobacco for good. ? Use nicotine gum, patches, or lozenges. ? Ask your doctor about stop-smoking programs and medicines. ? Keep trying. In addition to reducing your risk of diseases in the future, you will notice some benefits soon after you stop using tobacco. If you have shortness of breath or asthma symptoms, they will likely get better within a few weeks after you quit. · Limit how much alcohol you drink. Moderate amounts of alcohol (up to 2 drinks a day for men, 1 drink a day for women) are okay. But drinking too much can lead to liver problems, high blood pressure, and other health problems. Family health If you have a family, there are many things you can do together to improve your health. · Eat meals together as a family as often as possible. · Eat healthy foods. This includes fruits, vegetables, lean meats and dairy, and whole grains. · Include your family in your fitness plan. Most people think of activities such as jogging or tennis as the way to fitness, but there are many ways you and your family can be more active. Anything that makes you breathe hard and gets your heart pumping is exercise. Here are some tips: 
? Walk to do errands or to take your child to school or the bus. 
? Go for a family bike ride after dinner instead of watching TV. Where can you learn more? Go to http://www.[employer]/ Enter K707 in the search box to learn more about \"A Healthy Lifestyle: Care Instructions. \" Current as of: January 31, 2020               Content Version: 12.6 © 0297-4864 [employer]. Care instructions adapted under license by [employer] (which disclaims liability or warranty for this information). If you have questions about a medical condition or this instruction, always ask your healthcare professional. [place] [address] any warranty or liability for your use of this information.

## 2020-12-26 ENCOUNTER — E-VISIT (OUTPATIENT)
Dept: FAMILY MEDICINE CLINIC | Age: 20
End: 2020-12-26

## 2021-08-07 NOTE — MR AVS SNAPSHOT
Keep right arm elevated as much as possible  Keflex as directed  OTC Tylenol or Motrin as needed  Contact PCP on Monday to discuss reaction   Follow up with PCP or return to Urgent Care for worsening or unresolved symptoms. Patient Education        Cellulitis: Care Instructions  Your Care Instructions     Cellulitis is a skin infection caused by bacteria, most often strep or staph. It often occurs after a break in the skin from a scrape, cut, bite, or puncture, or after a rash. Cellulitis may be treated without doing tests to find out what caused it. But your doctor may do tests, if needed, to look for a specific bacteria, like methicillin-resistant Staphylococcus aureus (MRSA). The doctor has checked you carefully, but problems can develop later. If you notice any problems or new symptoms, get medical treatment right away. Follow-up care is a key part of your treatment and safety. Be sure to make and go to all appointments, and call your doctor if you are having problems. It's also a good idea to know your test results and keep a list of the medicines you take. How can you care for yourself at home? · Take your antibiotics as directed. Do not stop taking them just because you feel better. You need to take the full course of antibiotics. · Prop up the infected area on pillows to reduce pain and swelling. Try to keep the area above the level of your heart as often as you can. · If your doctor told you how to care for your wound, follow your doctor's instructions. If you did not get instructions, follow this general advice:  ? Wash the wound with clean water 2 times a day. Don't use hydrogen peroxide or alcohol, which can slow healing. ? You may cover the wound with a thin layer of petroleum jelly, such as Vaseline, and a nonstick bandage. ? Apply more petroleum jelly and replace the bandage as needed. · Be safe with medicines. Take pain medicines exactly as directed.   ? If the doctor gave you a Visit Information Date & Time Provider Department Dept. Phone Encounter #  
 3/28/2017 11:15 AM Darwin Sterling, 1924 [facility] Renae [phone] [id number] Upcoming Health Maintenance Date Due Hepatitis B Peds Age 0-18 (1 of 3 - Primary Series) 1999 Hepatitis A Peds Age 1-18 (1 of 2 - Standard Series) 3/15/2000 MMR Peds Age 1-18 (1 of 2) 3/15/2000 DTaP/Tdap/Td series (1 - Tdap) 3/15/2006 HPV AGE 9Y-26Y (1 of 3 - Female 3 Dose Series) 3/15/2010 Varicella Peds Age 1-18 (1 of 2 - 2 Dose Adolescent Series) 3/15/2012 Allergies as of 3/28/2017  Review Complete On: 3/28/2017 By: Darwin Sterling DO Severity Noted Reaction Type Reactions Adderall [Dextroamphetamine-amphetamine]  03/08/2016    Palpitations Current Immunizations  Reviewed on 3/8/2016 Name Date Meningococcal B (OMV) Vaccine 3/8/2016 Not reviewed this visit You Were Diagnosed With   
  
 Codes Comments Burning with urination    -  Primary ICD-10-CM: R30.0 ICD-9-CM: 055. 1 Vitals BP Pulse Temp Resp Height(growth percentile) Weight(growth percentile) 92/60 (5 %/ 32 %)* 82 98.2 °F (36.8 °C) (Oral) 18 5' 2\" (1.575 m) (19 %, Z= -0.87) 107 lb 3.2 oz (48.6 kg) (15 %, Z= -1.04) SpO2 BMI OB Status Smoking Status 99% 19.61 kg/m2 (27 %, Z= -0.61) Having regular periods Never Smoker *BP percentiles are based on NHBPEP's 4th Report Growth percentiles are based on CDC 2-20 Years data. Vitals History BMI and BSA Data Body Mass Index Body Surface Area  
 19.61 kg/m 2 1.46 m 2 Preferred Pharmacy Pharmacy Name Phone [facility] [address] [phone] Your Updated Medication List  
  
   
This list is accurate as of: 3/28/17 11:46 AM.  Always use your most recent med list.  
  
  
  
  
 citalopram 20 mg tablet Commonly known as:  Renee Emerson Take 1 Tab by mouth daily. guaiFENesin-codeine 100-10 mg/5 mL solution Commonly known as:  Lico Everton Take 5 mL by mouth three (3) times daily as needed for Cough. Max Daily Amount: 15 mL.  
  
 hydrOXYzine pamoate 50 mg capsule Commonly known as:  VISTARIL Take 1 Cap by mouth three (3) times daily as needed for Anxiety. sertraline 50 mg tablet Commonly known as:  ZOLOFT Take 1 Tab by mouth daily. We Performed the Following AMB POC URINALYSIS DIP STICK AUTO W/O MICRO [29823 CPT(R)] Patient Instructions Urinary Tract Infection in Women: Care Instructions Your Care Instructions A urinary tract infection, or UTI, is a general term for an infection anywhere between the kidneys and the urethra (where urine comes out). Most UTIs are bladder infections. They often cause pain or burning when you urinate. UTIs are caused by bacteria and can be cured with antibiotics. Be sure to complete your treatment so that the infection goes away. Follow-up care is a key part of your treatment and safety. Be sure to make and go to all appointments, and call your doctor if you are having problems. It's also a good idea to know your test results and keep a list of the medicines you take. How can you care for yourself at home? · Take your antibiotics as directed. Do not stop taking them just because you feel better. You need to take the full course of antibiotics. · Drink extra water and other fluids for the next day or two. This may help wash out the bacteria that are causing the infection. (If you have kidney, heart, or liver disease and have to limit fluids, talk with your doctor before you increase your fluid intake.) · Avoid drinks that are carbonated or have caffeine. They can irritate the bladder. · Urinate often. Try to empty your bladder each time.  
· To relieve pain, take a hot bath or lay a heating pad set on low over any warranty or liability for your use of this information. Patient Education        Recombinant Zoster (Shingles) Vaccine: What You Need to Know  Why get vaccinated? Recombinant zoster (shingles) vaccine can prevent shingles. Shingles (also called herpes zoster, or just zoster) is a painful skin rash, usually with blisters. In addition to the rash, shingles can cause fever, headache, chills, or upset stomach. More rarely, shingles can lead to pneumonia, hearing problems, blindness, brain inflammation (encephalitis), or death. The most common complication of shingles is long-term nerve pain called postherpetic neuralgia (PHN). PHN occurs in the areas where the shingles rash was, even after the rash clears up. It can last for months or years after the rash goes away. The pain from PHN can be severe and debilitating. About 10 to 18% of people who get shingles will experience PHN. The risk of PHN increases with age. An older adult with shingles is more likely to develop PHN and have longer lasting and more severe pain than a younger person with shingles. Shingles is caused by the varicella zoster virus, the same virus that causes chickenpox. After you have chickenpox, the virus stays in your body and can cause shingles later in life. Shingles cannot be passed from one person to another, but the virus that causes shingles can spread and cause chickenpox in someone who had never had chickenpox or received chickenpox vaccine. Recombinant shingles vaccine  Recombinant shingles vaccine provides strong protection against shingles. By preventing shingles, recombinant shingles vaccine also protects against PHN. Recombinant shingles vaccine is the preferred vaccine for the prevention of shingles. However, a different vaccine, live shingles vaccine, may be used in some circumstances. The recombinant shingles vaccine is recommended for adults 50 years and older without serious immune problems.  It is given as a your lower belly or genital area. Never go to sleep with a heating pad in place. To prevent UTIs · Drink plenty of water each day. This helps you urinate often, which clears bacteria from your system. (If you have kidney, heart, or liver disease and have to limit fluids, talk with your doctor before you increase your fluid intake.) · Consider adding cranberry juice to your diet. · Urinate when you need to. · Urinate right after you have sex. · Change sanitary pads often. · Avoid douches, bubble baths, feminine hygiene sprays, and other feminine hygiene products that have deodorants. · After going to the bathroom, wipe from front to back. When should you call for help? Call your doctor now or seek immediate medical care if: · Symptoms such as fever, chills, nausea, or vomiting get worse or appear for the first time. · You have new pain in your back just below your rib cage. This is called flank pain. · There is new blood or pus in your urine. · You have any problems with your antibiotic medicine. Watch closely for changes in your health, and be sure to contact your doctor if: 
· You are not getting better after taking an antibiotic for 2 days. · Your symptoms go away but then come back. Where can you learn more? Go to http://lucila-petar.info/. Enter M225 in the search box to learn more about \"Urinary Tract Infection in Women: Care Instructions. \" Current as of: August 12, 2016 Content Version: 11.1 © 1474-4032 [employer]. Care instructions adapted under license by [employer] (which disclaims liability or warranty for this information). Introducing [facility]!    
 Blaise Peters introduces [employer] patient portal. Now you can access parts of your medical record, email your doctor's office, and request medication refills online. 1. In your internet browser, go to [email or web address] 2. Click on the First Time User? Click Here link in the Sign In box. You will see the New Member Sign Up page. 3. Enter your Posse Access Code exactly as it appears below. You will not need to use this code after youve completed the sign-up process. If you do not sign up before the expiration date, you must request a new code. · Posse Access Code: [email or web address] Expires: 6/18/2017  8:21 AM 
 
4. Enter the last four digits of your Social Security Number (xxxx) and Date of Birth (mm/dd/yyyy) as indicated and click Submit. You will be taken to the next sign-up page. 5. Create a Posse ID. This will be your Posse login ID and cannot be changed, so think of one that is secure and easy to remember. 6. Create a Posse password. You can change your password at any time. 7. Enter your Password Reset Question and Answer. This can be used at a later time if you forget your password. 8. Enter your e-mail address. You will receive e-mail notification when new information is available in [address] 9. Click Sign Up. You can now view and download portions of your medical record. 10. Click the Download Summary menu link to download a portable copy of your medical information. If you have questions, please visit the Frequently Asked Questions section of the Posse website. Remember, Posse is NOT to be used for urgent needs. For medical emergencies, dial 911. Now available from your iPhone and Android! Please provide this summary of care documentation to your next provider. Your primary care clinician is listed as Mari Hernandez. If you have any questions after today's visit, please call [phone]. two-dose series. This vaccine is also recommended for people who have already gotten another type of shingles vaccine, the live shingles vaccine. There is no live virus in this vaccine. Shingles vaccine may be given at the same time as other vaccines. Talk with your health care provider  Tell your vaccine provider if the person getting the vaccine:  · Has had an allergic reaction after a previous dose of recombinant shingles vaccine, or has any severe, life-threatening allergies. · Is pregnant or breastfeeding. · Is currently experiencing an episode of shingles. In some cases, your health care provider may decide to postpone shingles vaccination to a future visit. People with minor illnesses, such as a cold, may be vaccinated. People who are moderately or severely ill should usually wait until they recover before getting recombinant shingles vaccine. Your health care provider can give you more information. Risks of a vaccine reaction  · A sore arm with mild or moderate pain is very common after recombinant shingles vaccine, affecting about 80% of vaccinated people. Redness and swelling can also happen at the site of the injection. · Tiredness, muscle pain, headache, shivering, fever, stomach pain, and nausea happen after vaccination in more than half of people who receive recombinant shingles vaccine. In clinical trials, about 1 out of 6 people who got recombinant zoster vaccine experienced side effects that prevented them from doing regular activities. Symptoms usually went away on their own in 2 to 3 days. You should still get the second dose of recombinant zoster vaccine even if you had one of these reactions after the first dose. People sometimes faint after medical procedures, including vaccination. Tell your provider if you feel dizzy or have vision changes or ringing in the ears.   As with any medicine, there is a very remote chance of a vaccine causing a severe allergic reaction, other serious injury, or death. What if there is a serious problem? An allergic reaction could occur after the vaccinated person leaves the clinic. If you see signs of a severe allergic reaction (hives, swelling of the face and throat, difficulty breathing, a fast heartbeat, dizziness, or weakness), call 9-1-1 and get the person to the nearest hospital.  For other signs that concern you, call your health care provider. Adverse reactions should be reported to the Vaccine Adverse Event Reporting System (VAERS). Your health care provider will usually file this report, or you can do it yourself. Visit the VAERS website at www.vaers. [facility].gov or call 4-882.534.2361. VAERS 10/30/2019  [facility] and [facility] [facility] ([facility]). 

## 2022-03-15 ENCOUNTER — HOSPITAL ENCOUNTER (EMERGENCY)
Age: 22
Discharge: HOME | End: 2022-03-15
Attending: FAMILY MEDICINE
Payer: MEDICAID

## 2022-03-15 ENCOUNTER — APPOINTMENT (OUTPATIENT)
Dept: GENERAL RADIOLOGY | Age: 22
End: 2022-03-15
Attending: FAMILY MEDICINE
Payer: MEDICAID

## 2022-03-15 VITALS
DIASTOLIC BLOOD PRESSURE: 61 MMHG | HEIGHT: 59 IN | BODY MASS INDEX: 21.97 KG/M2 | TEMPERATURE: 98.1 F | SYSTOLIC BLOOD PRESSURE: 110 MMHG | WEIGHT: 109 LBS | RESPIRATION RATE: 15 BRPM | HEART RATE: 98 BPM | OXYGEN SATURATION: 99 %

## 2022-03-15 DIAGNOSIS — S80.02XA CONTUSION OF LEFT KNEE, INITIAL ENCOUNTER: Primary | ICD-10-CM

## 2022-03-15 PROCEDURE — 74011250637 HC RX REV CODE- 250/637: Performed by: FAMILY MEDICINE

## 2022-03-15 PROCEDURE — 73502 X-RAY EXAM HIP UNI 2-3 VIEWS: CPT

## 2022-03-15 PROCEDURE — 73564 X-RAY EXAM KNEE 4 OR MORE: CPT

## 2022-03-15 PROCEDURE — 73610 X-RAY EXAM OF ANKLE: CPT

## 2022-03-15 PROCEDURE — 99283 EMERGENCY DEPT VISIT LOW MDM: CPT

## 2022-03-15 RX ORDER — CYCLOBENZAPRINE HCL 10 MG
10 TABLET ORAL
Status: DISCONTINUED | OUTPATIENT
Start: 2022-03-15 | End: 2022-03-16 | Stop reason: HOSPADM

## 2022-03-15 RX ORDER — CYCLOBENZAPRINE HCL 10 MG
10 TABLET ORAL
Qty: 15 TABLET | Refills: 0 | Status: SHIPPED | OUTPATIENT
Start: 2022-03-15 | End: 2022-03-20

## 2022-03-15 RX ADMIN — CYCLOBENZAPRINE 10 MG: 10 TABLET, FILM COATED ORAL at 23:49
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
## 2022-03-16 NOTE — DISCHARGE INSTRUCTIONS
Thank you! Thank you for allowing me to care for you in the emergency department. I sincerely hope that you are satisfied with your visit today. It is my goal to provide you with excellent care. Below you will find a list of your labs and imaging from your visit today. Should you have any questions regarding these results please do not hesitate to call the emergency department. Labs -   No results found for this or any previous visit (from the past 12 hour(s)). Radiologic Studies -   XR HIP LT W OR WO PELV 2-3 VWS   Final Result      1. No acute bony injury to the left hip identified. XR KNEE LT MIN 4 V   Final Result      1. No acute bony injury to the left knee. XR ANKLE LT MIN 3 V   Final Result      1. No acute bony injury to the left ankle identified. CT Results  (Last 48 hours)      None          CXR Results  (Last 48 hours)      None               If you feel that you have not received excellent quality care or timely care, please ask to speak to the nurse manager. Please choose us in the future for your continued health care needs. ------------------------------------------------------------------------------------------------------------  The exam and treatment you received in the Emergency Department were for an urgent problem and are not intended as complete care. It is important that you follow-up with a doctor, nurse practitioner, or physician assistant to:  (1) confirm your diagnosis,  (2) re-evaluation of changes in your illness and treatment, and  (3) for ongoing care. If your symptoms become worse or you do not improve as expected and you are unable to reach your usual health care provider, you should return to the Emergency Department. We are available 24 hours a day. Please take your discharge instructions with you when you go to your follow-up appointment.      If you have any problem arranging a follow-up appointment, contact the Emergency Department immediately. If a prescription has been provided, please have it filled as soon as possible to prevent a delay in treatment. Read the entire medication instruction sheet provided to you by the pharmacy. If you have any questions or reservations about taking the medication due to side effects or interactions with other medications, please call your primary care physician or contact the ER to speak with the charge nurse. Make an appointment with your family doctor or the physician you were referred to for follow-up of this visit as instructed on your discharge paperwork, as this is a mandatory follow-up. Return to the ER if you are unable to be seen or if you are unable to be seen in a timely manner. If you have any problem arranging the follow-up visit, contact the Emergency Department immediately.

## 2023-05-20 PROBLEM — R10.31 RIGHT LOWER QUADRANT ABDOMINAL PAIN: Status: ACTIVE | Noted: 2023-05-20
